# Patient Record
(demographics unavailable — no encounter records)

---

## 2024-10-08 NOTE — BIRTH HISTORY
[Unremarkable] : Unremarkable [Duration: ___ wks] : duration: [unfilled] weeks [Normal?] : normal pregnancy [Vaginal] : Vaginal [___ lbs.] : [unfilled] lbs [___ oz.] : [unfilled] oz.

## 2024-10-09 NOTE — PHYSICAL EXAM
[FreeTextEntry1] : Pleasant and cooperative with exam, appropriate for age. Ambulates without evidence of antalgia and limp, good coordination and balance. AAOX3  Skin: No rashes noted.  Eyes: Both conjunctiva, eyelids and pupils are present.  ENT:  Both ears, nose and lips are present. No nasal congestion.  Resp: No cough or wheezing noted.  Right thumb: No edema noted over the proximal phalanx. Full extension at the metacarpophalangeal joint and IP joint. No stiffness with flexion at the metacarpophalangeal joint. Full flexion at the IP joint. There is minimal discomfort elicited with palpation of the fracture site, base of proximal phalanx. 5\5 muscle strength. Capillary fill less than 2 seconds. The metacarpal phalange joint is stable with stress maneuvers.

## 2024-10-09 NOTE — END OF VISIT
[FreeTextEntry3] : A physician assistant/resident assisted with documenting the visit and acted as a scribe. I have seen and examined the patient, made my assessment and plan and have made all modifications necessary to the note.  Susan Willams MD Pediatric Orthopaedics Surgery [facility]

## 2024-10-09 NOTE — DATA REVIEWED
[de-identified] : Right thumb AP/lateral/oblique x-rays OOB ordered, done and independently reviewed today 10/08/24: Mildly displaced healing proximal phalanx Salter-Ott II fracture. Interval healing noted.

## 2024-10-09 NOTE — END OF VISIT
[FreeTextEntry3] : A physician assistant/resident assisted with documenting the visit and acted as a scribe. I have seen and examined the patient, made my assessment and plan and have made all modifications necessary to the note.  Susan Willams MD Pediatric Orthopaedics Surgery [facility]
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
## 2024-10-09 NOTE — DATA REVIEWED
[de-identified] : Right thumb AP/lateral/oblique x-rays OOB ordered, done and independently reviewed today 10/08/24: Mildly displaced healing proximal phalanx Salter-Ott II fracture. Interval healing noted.
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
